# Patient Record
Sex: FEMALE | Race: OTHER | HISPANIC OR LATINO
[De-identification: names, ages, dates, MRNs, and addresses within clinical notes are randomized per-mention and may not be internally consistent; named-entity substitution may affect disease eponyms.]

---

## 2019-02-25 ENCOUNTER — APPOINTMENT (OUTPATIENT)
Dept: FAMILY MEDICINE | Facility: CLINIC | Age: 49
End: 2019-02-25
Payer: OTHER MISCELLANEOUS

## 2019-02-25 VITALS
BODY MASS INDEX: 35.5 KG/M2 | DIASTOLIC BLOOD PRESSURE: 102 MMHG | HEIGHT: 61 IN | HEART RATE: 90 BPM | WEIGHT: 188 LBS | SYSTOLIC BLOOD PRESSURE: 152 MMHG

## 2019-02-25 DIAGNOSIS — M54.16 RADICULOPATHY, LUMBAR REGION: ICD-10-CM

## 2019-02-25 DIAGNOSIS — Z83.3 FAMILY HISTORY OF DIABETES MELLITUS: ICD-10-CM
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
## 2019-02-25 DIAGNOSIS — E66.1 DRUG-INDUCED OBESITY: ICD-10-CM

## 2019-02-25 DIAGNOSIS — Z78.9 OTHER SPECIFIED HEALTH STATUS: ICD-10-CM

## 2019-02-25 PROBLEM — Z00.00 ENCOUNTER FOR PREVENTIVE HEALTH EXAMINATION: Status: ACTIVE | Noted: 2019-02-25

## 2019-02-25 PROCEDURE — 99243 OFF/OP CNSLTJ NEW/EST LOW 30: CPT

## 2019-02-25 RX ORDER — GABAPENTIN 100 MG/1
100 CAPSULE ORAL
Refills: 0 | Status: ACTIVE | COMMUNITY

## 2019-02-25 NOTE — HISTORY OF PRESENT ILLNESS
[FreeTextEntry1] : Spinal cord stimulator through laminectomy [FreeTextEntry2] : 3/4/19 [FreeTextEntry3] : Dr. Jose Broderick [FreeTextEntry4] : The patient has history of work related injury 3 years ago and she is S/P  right ankle ORIF with Dr Swartz\par The patient has right S1 radiculitis related to her work related injury being under the care of Dr. Jose Borderick.

## 2019-02-25 NOTE — PHYSICAL EXAM

## 2019-02-25 NOTE — PLAN
[FreeTextEntry1] : Pre-op investigation done at [facility] in [place] reviewed\par Medically stable and cleared for anesthesia and surgery

## 2019-02-26 ENCOUNTER — RECORD ABSTRACTING (OUTPATIENT)
Age: 49
End: 2019-02-26

## 2019-02-26 DIAGNOSIS — F32.9 MAJOR DEPRESSIVE DISORDER, SINGLE EPISODE, UNSPECIFIED: ICD-10-CM

## 2019-02-26 DIAGNOSIS — E66.9 OBESITY, UNSPECIFIED: ICD-10-CM

## 2019-02-26 DIAGNOSIS — Z82.49 FAMILY HISTORY OF ISCHEMIC HEART DISEASE AND OTHER DISEASES OF THE CIRCULATORY SYSTEM: ICD-10-CM

## 2019-02-26 DIAGNOSIS — F17.200 NICOTINE DEPENDENCE, UNSPECIFIED, UNCOMPLICATED: ICD-10-CM

## 2019-02-26 RX ORDER — ESCITALOPRAM OXALATE 20 MG/1
20 TABLET, FILM COATED ORAL
Refills: 0 | Status: ACTIVE | COMMUNITY

## 2019-03-04 ENCOUNTER — HOSPITAL ENCOUNTER (INPATIENT)
Dept: HOSPITAL 74 - JASU-SURG | Age: 49
LOS: 3 days | Discharge: HOME | DRG: 23 | End: 2019-03-07
Payer: COMMERCIAL

## 2019-03-04 VITALS — BODY MASS INDEX: 31.1 KG/M2

## 2019-03-04 DIAGNOSIS — G96.19: ICD-10-CM

## 2019-03-04 DIAGNOSIS — G90.521: Primary | ICD-10-CM

## 2019-03-04 DIAGNOSIS — G96.12: ICD-10-CM

## 2019-03-04 PROCEDURE — 0JH70DZ INSERTION OF MULTIPLE ARRAY STIMULATOR GENERATOR INTO BACK SUBCUTANEOUS TISSUE AND FASCIA, OPEN APPROACH: ICD-10-PCS

## 2019-03-04 PROCEDURE — 00HV0MZ INSERTION OF NEUROSTIMULATOR LEAD INTO SPINAL CORD, OPEN APPROACH: ICD-10-PCS

## 2019-03-04 PROCEDURE — B01BYZZ FLUOROSCOPY OF SPINAL CORD USING OTHER CONTRAST: ICD-10-PCS

## 2019-03-04 PROCEDURE — 00NX0ZZ RELEASE THORACIC SPINAL CORD, OPEN APPROACH: ICD-10-PCS

## 2019-03-04 RX ADMIN — SODIUM CHLORIDE, POTASSIUM CHLORIDE, SODIUM LACTATE AND CALCIUM CHLORIDE SCH MLS/HR: 600; 310; 30; 20 INJECTION, SOLUTION INTRAVENOUS at 17:20

## 2019-03-04 RX ADMIN — CEFAZOLIN SODIUM SCH MLS/HR: 1 INJECTION, SOLUTION INTRAVENOUS at 21:17

## 2019-03-04 RX ADMIN — POTASSIUM CHLORIDE, DEXTROSE MONOHYDRATE AND SODIUM CHLORIDE SCH: 150; 5; 450 INJECTION, SOLUTION INTRAVENOUS at 17:35

## 2019-03-04 NOTE — PN
Progress Note (short form)





- Note


Progress Note: 





NEUROSURGERY





Pt seen and examined


Sister at bedside


Incisions marked T12 and R buttock with pt in sitting position


Trial fluoro image reviewed, trial electrode T8-T11 though stimulating T10-11


For R T12 hemilaminectomy and epidural paddle electrode and generator R buttocks

, additional levels of laminectomies for epidural lysis of adhesion to advance 

the electrode may be needed


Risks and benefits previously discussed


All questions answered

## 2019-03-04 NOTE — PN
Progress Note (short form)





- Note


Progress Note: 





NEUROSURGERY





In PACU


98.2, VSS, 100% sat


S/p R T12 hemilaminectomy and epidural paddle electrode and generator R buttocks

, additional levels of laminectomies R T11-12 for lysis of adhesion 


PE: General- unremarkable


CN- intact; Motor- 4+-5 B UE/LE: Sensation- slight R foot numbness


Dressings clean/dry


eRX sent for pain med prn


Instructions given


All questions answered


F/u plan discussed with pt and sister

## 2019-03-04 NOTE — OP
Operative Note





- Note:


Operative Date: 03/04/19


Pre-Operative Diagnosis: R LE complex regional pain syndrome


Operation: Partial lower R T12 laminectomies for placement of epidural paddle 

electrode; partial R lower T11 and upper T12 hemilaminectomies for lysis of 

adhesion and  decompression; microdissection, pulse generator implant R buttock

; intra-operative fluoroscopy; intra-op initial programming


Findings: 





Epidural fibrosis especially near midline at T11-12


Implants: Nevro SCS system; Surpass epidural paddle electrode 2x8; Senza2 

generator


Post-Operative Diagnosis: Same as Pre-op


Surgeon: Conor Bazan


Assistant: Romulo Hinds


Anesthesiologist/CRNA: Jeanette Doan MD


Anesthesia: General


Estimated Blood Loss (mls): 25 coffee

## 2019-03-04 NOTE — SURG
Surgery First Assist Note


First Assist: Romulo Hinds PA-C


Date of Service: 03/04/19


Diagnosis: 


 Rt Lower extremity complex regional pain syndrome








Procedure: 


 Partial lower R T12 laminectomies for placement of epidural paddle electrode; 

partial R lower T11 and upper T12 hemilaminectomies for lysis of adhesion and  

decompression; microdissection, pulse generator implant R buttock; intra-

operative fluoroscopy; intra-op initial programming





I was present for the entirety of the operative procedure. For further detail, 

please refer to operative report.








Visit type





- Case Type


Case Type: Scheduled





- Emergency


Emergency Visit: No





- New patient


This patient is new to me today: Yes


Date on this admission: 03/04/19

## 2019-03-04 NOTE — OP
DATE OF OPERATION:  03/04/2019

 

PREOPERATIVE DIAGNOSIS:  Right foot complex regional pain syndrome.

 

POSTOPERATIVE DIAGNOSIS:  Right foot complex regional pain syndrome.

 

ATTENDING SURGEON:  Conor Bazan MD

 

ASSISTANT:  Romulo Hinds PA-C

 

ANESTHESIA:  General endotracheal.

 

ANESTHESIOLOGIST:  Jeanette Doan MD

 

ESTIMATED BLOOD LOSS:  25 mL

 

PROCEDURE:

1.  Partial right lower T12 laminectomy for placement of epidural paddle 
electrode

(27615).

2.  Additional laminectomy on the right at T11 and T12 for decompression of the

spinal cord and lysis of epidural adhesion (74620-27, 45174).

3.  Microsurgical dissection (37172).

4.  Placement of pulse generator implant in the right buttock region (74519).

5.  Intraoperative programming first hour (02263).

6.  Intraoperative fluoroscopy for localization and placement of epidural 
electrode

(81065-20).

 

FINDINGS:  Dense epidural fibrosis  and neovascularization, especially near the

center of the spinal canal at T11-T12.

 

INDICATIONS:  The patient is a 48-year-old female with history of intractable 
right

foot pain and paresthesia.  Because of intractable symptoms and failure of

conservative treatment, she underwent a spinal cord stimulator trial with good 
relief

of her foot pain.  She is now consented for a permanent spinal cord stimulator

implant with paddle electrode.  The risks of procedure include, but are not 
limited

to, bleeding, infection, dural tear with CSF leak, neurological injury, 
increased

thromboembolic risk, and other risks of general anesthesia.  The patient 
understands

the indication for the procedure, procedure in detail, and risks and benefits 
and

alternatives for treatment of her right lower extremity symptoms and wished to

proceed with surgery.  No guarantees were given for a favorable outcome.

 

PROCEDURE IN DETAIL:  After patient was taken to the operating room, she was 
placed

in supine position.  After general anesthesia was induced and appropriate lines 
were

placed, she was turned in a prone position on a Ryan frame.  All pressure 
points

were checked and padded.  Lower thoracic level and right buttock region were 
cleaned

with alcohol and Betadine.  Her incisions were previously planned with her in 
the

sitting position to make sure that it does not bother her near the buttock or 
the

belt line.  After the patient was sterilely prepped and draped and the incision 
was

localized with fluoroscopic imaging, an approximately 1-1/2-inch incision was 
opened

in the lower thoracic region over lower T12 area.  Subperiosteal dissection was

carried out with periosteal elevator and monopolar electrocautery.  The lamina 
of T12

was exposed as well as the top of L1.  A self-retaining retractor was inserted. 

Localizing fluoroscopic images were obtained with the patient in supine and 
perfect

AP plane.  The fluoroscope was draped sterilely first.  After position was 
verified,

partial lower T12 laminectomy was carried out with high-speed pneumatic drill, 
angled

curette, and Kerrison rongeur.  The underlying ligamentum flavum was dissected 
free

with angled curette and Kerrison rongeur.  Only mild-moderate degree epidural

fibrosis was noted at this level and was neovascularization.  Hemostasis was 
obtained

with bipolar electrocautery and thrombin-filled powder Gelfoam.  At this point, 
the

epidural paddle electrode from [place] was inserted.  However, it either veered 
left or

veered right on 2 different attempts.  The resistance was met at center of the 
spinal

canal.  Because of the inability to get optimal position, the skin incision was

extended cephalad to expose the T11-T12 interspace.  Another self-retaining 
retractor

was placed.  Partial laminectomy involving the lamina of T11 and top of the 
lamina of

T12 was carried out, extending to the midline and slightly past the midline. 

Underlying ligamentum flavum was dissected free and resected with Kerrison 
rongeur. 

Moderate dense epidural fibrosis was noted at this level, and hemostasis was 
obtained

with bipolar electrocautery.  The microsurgical technique was carried out with 
the

use of the operating microscope for both illumination and magnification. 

Microsurgical techniques were utilized.  The bottom lamina of T11 and top 
lamina of

T12 were partially resected to decompress the spinal canal and to lyse the 
epidural

adhesion and to create a path for the epidural paddle electrode, so it could be

accommodated in the midline.  The paddle electrode was then placed once against 
the

right lower T12 laminectomy defect and slid cephalad.  Finally, the position was

excellent and exactly in the midline, with top of the electrode sitting at T9-
10 disk

space level.  Both T10 and T11 vertebral bodies were covered, which was the 
desired

coverage area.   Anchors were inserted at this time and secured with 2-0 silk 
sutures.

 

Attention was turned to the right buttock region where the buttock incision was

opened with a number 10 blade, and the pocket was made with blunt and sharp

dissection.  The pocket was irrigated with antibiotic-containing irrigation.  A

trocar was used to create a path from the thoracic to the right buttock incision
, and

the epidural electrodes were tunneled from the thoracic to right buttock 
incision. 

The electrodes were cleaned and inserted into the generator.  Impedance was 
checked,

and they were all in the 400-600 Ohm range.  A torque wrench was used to lock 
down

the epidural paddle electrode leads.  Dorsal thoracic fascia was then closed 
with 0

Vicryl suture, subcutaneous fascia was closed with 3-0 Vicryl suture, and skin 
was

closed with 4-0 Vicryl running subcuticular suture.  Steri-Strips and a sterile

occlusive dressing were applied for both incisions.  The excess lead length was

hidden under the generator in the right buttock incision.  Hemostasis was 
obtained

prior to closure.

 

The impedance was once again checked, and fluoroscopic images were 
intermittently

checked to make sure the electrode did not migrate.  The electrode remained in

excellent position at the end of the procedure.

 

The patient tolerated the procedure well, was extubated in the operating room.  
She

was moving bilateral upper and lower extremities while in the recovery room.  
All

needle and lap counts were correct.  The patient received 1 dose of 2 g of Ancef

prior to the incision.  OR timeout procedure was followed.  The patient's 
family and

the patient were both advised of the intraoperative finding as well as the 
patient's

postoperative condition.

 

 

BROWN [id number]

DD: 03/04/2019 11:12

DT: 03/04/2019 21:03

Job #:  [id number]

MTDD

## 2019-03-05 RX ADMIN — DOCUSATE SODIUM SCH MG: 100 CAPSULE, LIQUID FILLED ORAL at 21:39

## 2019-03-05 RX ADMIN — GABAPENTIN SCH MG: 300 CAPSULE ORAL at 09:04

## 2019-03-05 RX ADMIN — CEFAZOLIN SODIUM SCH MLS/HR: 1 INJECTION, SOLUTION INTRAVENOUS at 10:44

## 2019-03-05 RX ADMIN — SODIUM CHLORIDE, POTASSIUM CHLORIDE, SODIUM LACTATE AND CALCIUM CHLORIDE SCH MLS/HR: 600; 310; 30; 20 INJECTION, SOLUTION INTRAVENOUS at 16:21

## 2019-03-05 RX ADMIN — DULOXETINE SCH MG: 30 CAPSULE, DELAYED RELEASE ORAL at 09:04

## 2019-03-05 RX ADMIN — SODIUM CHLORIDE, POTASSIUM CHLORIDE, SODIUM LACTATE AND CALCIUM CHLORIDE SCH MLS/HR: 600; 310; 30; 20 INJECTION, SOLUTION INTRAVENOUS at 02:33

## 2019-03-05 RX ADMIN — CEFAZOLIN SODIUM SCH MLS/HR: 1 INJECTION, SOLUTION INTRAVENOUS at 02:58

## 2019-03-05 RX ADMIN — CYCLOBENZAPRINE HYDROCHLORIDE SCH MG: 5 TABLET, FILM COATED ORAL at 21:39

## 2019-03-05 RX ADMIN — CYCLOBENZAPRINE HYDROCHLORIDE SCH MG: 5 TABLET, FILM COATED ORAL at 15:22

## 2019-03-05 RX ADMIN — CEFAZOLIN SODIUM SCH MLS/HR: 1 INJECTION, SOLUTION INTRAVENOUS at 15:52

## 2019-03-05 RX ADMIN — POTASSIUM CHLORIDE, DEXTROSE MONOHYDRATE AND SODIUM CHLORIDE SCH: 150; 5; 450 INJECTION, SOLUTION INTRAVENOUS at 10:45

## 2019-03-05 RX ADMIN — DOCUSATE SODIUM SCH MG: 100 CAPSULE, LIQUID FILLED ORAL at 15:22

## 2019-03-05 NOTE — PN
Progress Note (short form)





- Note


Progress Note: 





NEUROSURGERY





POD #1





Tmax 99.1, VSS


S/p R T12 hemilaminectomy and epidural paddle electrode and generator R buttocks

, additional levels of laminectomies R T11-12 for lysis of adhesion 


c/o incisional pain of lower thoracic and  buttock incisions exclusively


On oxycodone first 5 then 10 mg then MS IVP, also given valium, marginal pain 

relief


Able to void on bed pan


PE: General- unremarkable


CN- intact; Motor- 4- B UE/LE pain limiited except R foot ROM/strength limited 

by RSD: Sensation- slight R foot numbness


Dressings clean/dry, changed


Able to roll side to side by herself in bed


Likely lower pain threshold with h/o RSD


Convert to inpatient for better pain control and PT


Change to dilaudid prn


All questions answered


Encouraged LE ROM/movement/OOB to prevent DVT


PT ordered


Add Flexeril and d/c valium


Consult pain management (d/w Dr Miller)


F/u plan discussed with pt

## 2019-03-06 LAB
BASOPHILS # BLD: 0.5 % (ref 0–2)
DEPRECATED RDW RBC AUTO: 13.3 % (ref 11.6–15.6)
EOSINOPHIL # BLD: 3.6 % (ref 0–4.5)
HCT VFR BLD CALC: 34.7 % (ref 32.4–45.2)
HGB BLD-MCNC: 11.9 GM/DL (ref 10.7–15.3)
LYMPHOCYTES # BLD: 18.2 % (ref 8–40)
MCH RBC QN AUTO: 31.9 PG (ref 25.7–33.7)
MCHC RBC AUTO-ENTMCNC: 34.3 G/DL (ref 32–36)
MCV RBC: 92.8 FL (ref 80–96)
MONOCYTES # BLD AUTO: 7 % (ref 3.8–10.2)
NEUTROPHILS # BLD: 70.7 % (ref 42.8–82.8)
PLATELET # BLD AUTO: 228 K/MM3 (ref 134–434)
PMV BLD: 10 FL (ref 7.5–11.1)
RBC # BLD AUTO: 3.74 M/MM3 (ref 3.6–5.2)
WBC # BLD AUTO: 9.6 K/MM3 (ref 4–10)

## 2019-03-06 RX ADMIN — DOCUSATE SODIUM SCH MG: 100 CAPSULE, LIQUID FILLED ORAL at 14:57

## 2019-03-06 RX ADMIN — GABAPENTIN SCH MG: 300 CAPSULE ORAL at 10:47

## 2019-03-06 RX ADMIN — DOCUSATE SODIUM SCH MG: 100 CAPSULE, LIQUID FILLED ORAL at 06:19

## 2019-03-06 RX ADMIN — CYCLOBENZAPRINE HYDROCHLORIDE SCH MG: 5 TABLET, FILM COATED ORAL at 06:19

## 2019-03-06 RX ADMIN — DOCUSATE SODIUM SCH MG: 100 CAPSULE, LIQUID FILLED ORAL at 21:51

## 2019-03-06 RX ADMIN — CYCLOBENZAPRINE HYDROCHLORIDE SCH MG: 5 TABLET, FILM COATED ORAL at 14:57

## 2019-03-06 RX ADMIN — CYCLOBENZAPRINE HYDROCHLORIDE SCH MG: 5 TABLET, FILM COATED ORAL at 21:51

## 2019-03-06 RX ADMIN — DULOXETINE SCH MG: 30 CAPSULE, DELAYED RELEASE ORAL at 10:47

## 2019-03-06 NOTE — CONSULT
Consult


Consult Specialty:: pain medicine


Referred by:: dr mercado


Reason for Consultation:: back pain





- History of Present Illness


Chief Complaint: back pain


History of Present Illness: 





48 year old woman with a history of a previous work related injury which caused 

crps on right ankle underwent scs implant with dr mercado.


she reports severe incisional pain for the first 24hrs after the surgery with 

minimal pain relief at first.





currently she reports better control of the pain.  she is on gabapentin, 

flexeril, oxycodone and dilaudid prn pain.





Pain score at rest 2/10


when moving 8/10





- Past Medical History


...LMP: 02/23/19





- Alcohol/Substance Use


Hx Alcohol Use: Yes (SOCIAL)





- Smoking History


Smoking history: Never smoked





Home Medications





- Allergies


Allergies/Adverse Reactions: 


 Allergies











Allergy/AdvReac Type Severity Reaction Status Date / Time


 


No Known Allergies Allergy   Verified 03/01/19 09:35














- Home Medications


Home Medications: 


Ambulatory Orders





Duloxetine HCl 30 mg PO DAILY 03/01/19 


Gabapentin [Neurontin] 300 mg PO DAILY 03/01/19 


Multivitamin [One-Daily Multi-Vitamin] 1 each PO DAILY 03/01/19 


oxyCODONE HCL [Roxicodone -] 5 mg PO Q6H #20 tablet MDD 4 03/04/19 


Cephalexin Monohydrate [Keflex -] 500 mg PO Q6H #30 capsule MDD 4 03/06/19 


Cyclobenzaprine HCl 5 mg PO TID 14 Days #42 tablet MDD 3 03/06/19 











Physical Exam


Vital Signs: 


 Vital Signs











Temperature  99.7 F H  03/06/19 05:58


 


Pulse Rate  108 H  03/06/19 05:58


 


Respiratory Rate  20   03/06/19 05:58


 


Blood Pressure  131/72   03/06/19 05:58


 


O2 Sat by Pulse Oximetry (%)  96   03/05/19 21:00











Musculoskeletal: Yes: Back Pain


Labs: 


 CBC, BMP





 03/06/19 07:37 











Assessment/Plan





Right foot crps


Status post scs implant


improving incisional post op pain





patient currently treated with a multimodal approach. would continue current 

regimen of gabapentin, cymbalta, oxycodone and dilaudid for severe pain. 

patient has improved.





consider lidocaine ointment around incision if ok with dr mercado.

## 2019-03-06 NOTE — PN
Progress Note (short form)





- Note


Progress Note: 





NEUROSURGERY





POD #2





Family at bedside


Tmax 99.7, VSS


S/p R T12 hemilaminectomy and epidural paddle electrode and generator R buttocks

, additional levels of laminectomies R T11-12 for lysis of adhesion 


c/o incisional pain of lower thoracic and  buttock incisions exclusively


On oxycodone 10 mg and prn dilaudid ivp, also given flexeril


Able to void on bed pan


PE: General- unremarkable; CV-RRR; Lungs- CTA; Abd- benign; Ext- no sign of DVT


CN- intact; Motor- 4- B UE/LE pain limiited except R foot ROM/strength limited 

by RSD: Sensation- slight R foot numbness


Dressings clean/dry, changed x2


Likely lower pain threshold with h/o RSD


on PRN dilaudid 


All questions answered


Encouraged LE ROM/movement/OOB to prevent DVT


PT today earlier and continue


Cont Flexeril 


d/w Dr Miller


F/u plan and wound care discussed with pt and family

## 2019-03-07 VITALS — SYSTOLIC BLOOD PRESSURE: 130 MMHG | DIASTOLIC BLOOD PRESSURE: 60 MMHG | HEART RATE: 99 BPM

## 2019-03-07 VITALS — TEMPERATURE: 98.5 F

## 2019-03-07 RX ADMIN — GABAPENTIN SCH MG: 300 CAPSULE ORAL at 09:40

## 2019-03-07 RX ADMIN — DULOXETINE SCH MG: 30 CAPSULE, DELAYED RELEASE ORAL at 09:40

## 2019-03-07 RX ADMIN — DOCUSATE SODIUM SCH MG: 100 CAPSULE, LIQUID FILLED ORAL at 06:06

## 2019-03-07 RX ADMIN — CYCLOBENZAPRINE HYDROCHLORIDE SCH MG: 5 TABLET, FILM COATED ORAL at 06:06

## 2019-03-07 NOTE — PN
Progress Note (short form)





- Note


Progress Note: 





NEUROSURGERY





POD #3





Up with PT earlier


Tmax 98.5, VSS


O2 sat 97%


Doing incentive spirometry


S/p R T12 hemilaminectomy and epidural paddle electrode and generator R buttocks

, additional levels of laminectomies R T11-12 for lysis of adhesion 


c/o incisional pain of lower thoracic and  buttock incisions exclusively


On oxycodone 10 mg and prn dilaudid ivp, and flexeril


Able to void on bed pan


PE: General- unremarkable; CV-RRR; Lungs- CTA; Abd- benign; Ext- no sign of DVT


CN- intact; Motor- 4 B UE/LE pain limited except R foot ROM/strength limited by 

RSD: Sensation- slight R foot numbness


Dressings clean/dry


Likely lower pain threshold with h/o RSD


on PRN dilaudid 


All questions answered


Encouraged LE ROM/movement/OOB to prevent DVT


Appreciate Dr Miller's input


F/u plan and wound care discussed with pt and family last night

## 2020-02-06 ENCOUNTER — APPOINTMENT (OUTPATIENT)
Dept: FAMILY MEDICINE | Facility: CLINIC | Age: 50
End: 2020-02-06

## 2020-04-08 ENCOUNTER — APPOINTMENT (OUTPATIENT)
Dept: FAMILY MEDICINE | Facility: CLINIC | Age: 50
End: 2020-04-08
Payer: COMMERCIAL

## 2020-04-08 DIAGNOSIS — B34.9 VIRAL INFECTION, UNSPECIFIED: ICD-10-CM

## 2020-04-08 PROCEDURE — 99058 OFFICE EMERGENCY CARE: CPT

## 2020-04-08 PROCEDURE — 99213 OFFICE O/P EST LOW 20 MIN: CPT | Mod: 25

## 2020-04-08 NOTE — PLAN
[FreeTextEntry1] : Very likely  Covid-19 infection\par Isolate at home\par Take Tylenol\par monitore T\par call if getting worse\par

## 2020-04-08 NOTE — HISTORY OF PRESENT ILLNESS
[FreeTextEntry8] : reports body ache, fever (not measured by tremometer), chills, body ache x 2 days\par states that was told by urgent care  health that she cannot be seen until tomorrow \par

## 2020-04-11 ENCOUNTER — NON-APPOINTMENT (OUTPATIENT)
Age: 50
End: 2020-04-11

## 2020-04-13 ENCOUNTER — LABORATORY RESULT (OUTPATIENT)
Age: 50
End: 2020-04-13

## 2020-04-13 ENCOUNTER — NON-APPOINTMENT (OUTPATIENT)
Age: 50
End: 2020-04-13

## 2020-04-14 LAB
FLU A RESULT: NOT DETECTED
FLU B RESULT: NOT DETECTED
RSV RESULT: NOT DETECTED

## 2020-04-21 ENCOUNTER — TRANSCRIPTION ENCOUNTER (OUTPATIENT)
Age: 50
End: 2020-04-21

## 2024-01-29 NOTE — DS
Physical Examination


Vital Signs: 


 Vital Signs











Temperature  98.5 F   03/07/19 05:23


 


Pulse Rate  90   03/07/19 05:23


 


Respiratory Rate  19   03/07/19 05:23


 


Blood Pressure  107/68   03/07/19 05:23


 


O2 Sat by Pulse Oximetry (%)  97   03/06/19 21:00











...Rectal Exam: Yes: Deferred


Edema: No


Peripheral Pulses WNL: Yes


Integumentary: Yes: WNL, Incision


Wound/Incision: Yes: Dressing Dry and Intact


Neurological: Yes: WNL


...Motor Strength: WNL (R LE limited by RSD Wound care and post-op instructions 

given; eRx sent; care d/w Dr Miller, pain management)


Labs: 


 CBC, BMP





 03/06/19 07:37 











Discharge Summary


Reason For Visit: SPINAL CORD STIMULATOR IMPLANT THRU LAMINECTOMY


Condition: Stable





- Instructions


Diet, Activity, Other Instructions: 


Post-op SCS





Diet: Regular





Activity: May shower but keep incision line dry.  Change dressings afterwards. (

sterile 4x4 and tegaderm)





Restrictions: Do not lift anything over 10lbs.





Additional Instructions: Keep incision line clean and dry. Change dressing 

daily. Report any chills,


                                fever (100.5 or greater), any wound drainage, 

or any neurological changes to Dr. Bazan. 


                                Do not drive for two weeks.





Initial postop appt: Call Dr Bazan's office [phone] to be seen in about 2 

weeks after surgery


Referrals: 


Conor Bazan MD [Staff Physician] - 


Disposition: HOME





- Home Medications


Comprehensive Discharge Medication List: 


Ambulatory Orders





Duloxetine HCl 30 mg PO DAILY 03/01/19 


Gabapentin [Neurontin] 300 mg PO DAILY 03/01/19 


Multivitamin [One-Daily Multi-Vitamin] 1 each PO DAILY 03/01/19 


Cephalexin Monohydrate [Keflex -] 500 mg PO Q6H #30 capsule MDD 4 03/06/19 


Cyclobenzaprine HCl 5 mg PO TID 14 Days #42 tablet MDD 3 03/06/19 


Tapentadol Hydrochloride [Nucynta -] 50 mg PO Q6H #30 tablet MDD 4 03/07/19 [Sprain/Strain] : sprain/strain [Was the competent medical cause of the injury] : was the competent medical cause of the injury [Are consistent with the injury] : are consistent with the injury [Consistent with my objective findings] : consistent with my objective findings [Partial] : partial [Does not reveal pre-existing condition(s) that may affect treatment/prognosis] : does not reveal pre-existing condition(s) that may affect treatment/prognosis [Can return to work without limitations on ______] : can return to work without limitations on [unfilled] [Patient] : patient [Yes. explain: __________] : Yes: [unfilled] [No Rx restrictions] : No Rx restrictions. [I provided the services listed above] :  I provided the services listed above.

## 2024-11-20 ENCOUNTER — APPOINTMENT (OUTPATIENT)
Dept: FAMILY MEDICINE | Facility: CLINIC | Age: 54
End: 2024-11-20
Payer: MEDICARE

## 2024-11-20 VITALS
RESPIRATION RATE: 15 BRPM | OXYGEN SATURATION: 98 % | TEMPERATURE: 97.7 F | SYSTOLIC BLOOD PRESSURE: 122 MMHG | WEIGHT: 140 LBS | HEIGHT: 61 IN | DIASTOLIC BLOOD PRESSURE: 85 MMHG | HEART RATE: 82 BPM | BODY MASS INDEX: 26.43 KG/M2

## 2024-11-20 VITALS — SYSTOLIC BLOOD PRESSURE: 120 MMHG | DIASTOLIC BLOOD PRESSURE: 74 MMHG
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
## 2024-11-20 DIAGNOSIS — Z83.3 FAMILY HISTORY OF DIABETES MELLITUS: ICD-10-CM

## 2024-11-20 DIAGNOSIS — F32.A DEPRESSION, UNSPECIFIED: ICD-10-CM

## 2024-11-20 DIAGNOSIS — Z12.11 ENCOUNTER FOR SCREENING FOR MALIGNANT NEOPLASM OF COLON: ICD-10-CM

## 2024-11-20 DIAGNOSIS — Z12.39 ENCOUNTER FOR OTHER SCREENING FOR MALIGNANT NEOPLASM OF BREAST: ICD-10-CM

## 2024-11-20 DIAGNOSIS — B34.9 VIRAL INFECTION, UNSPECIFIED: ICD-10-CM

## 2024-11-20 DIAGNOSIS — Z76.89 PERSONS ENCOUNTERING HEALTH SERVICES IN OTHER SPECIFIED CIRCUMSTANCES: ICD-10-CM

## 2024-11-20 DIAGNOSIS — M54.16 RADICULOPATHY, LUMBAR REGION: ICD-10-CM

## 2024-11-20 PROCEDURE — 99204 OFFICE O/P NEW MOD 45 MIN: CPT

## 2024-11-20 PROCEDURE — G2211 COMPLEX E/M VISIT ADD ON: CPT

## 2024-11-20 RX ORDER — SERTRALINE 25 MG/1
25 TABLET, FILM COATED ORAL
Qty: 90 | Refills: 0 | Status: ACTIVE | COMMUNITY
Start: 2024-11-20

## 2024-11-27 ENCOUNTER — APPOINTMENT (OUTPATIENT)
Dept: FAMILY MEDICINE | Facility: CLINIC | Age: 54
End: 2024-11-27
Payer: MEDICARE

## 2024-11-27 VITALS
RESPIRATION RATE: 15 BRPM | DIASTOLIC BLOOD PRESSURE: 87 MMHG | HEIGHT: 61 IN | TEMPERATURE: 98.3 F | OXYGEN SATURATION: 97 % | HEART RATE: 92 BPM | WEIGHT: 141 LBS | SYSTOLIC BLOOD PRESSURE: 122 MMHG | BODY MASS INDEX: 26.62 KG/M2

## 2024-11-27 VITALS — SYSTOLIC BLOOD PRESSURE: 110 MMHG | DIASTOLIC BLOOD PRESSURE: 80 MMHG | HEART RATE: 100 BPM

## 2024-11-27 DIAGNOSIS — F41.9 ANXIETY DISORDER, UNSPECIFIED: ICD-10-CM

## 2024-11-27 PROCEDURE — G2211 COMPLEX E/M VISIT ADD ON: CPT

## 2024-11-27 PROCEDURE — 99213 OFFICE O/P EST LOW 20 MIN: CPT
